# Patient Record
Sex: MALE | Employment: FULL TIME | URBAN - METROPOLITAN AREA
[De-identification: names, ages, dates, MRNs, and addresses within clinical notes are randomized per-mention and may not be internally consistent; named-entity substitution may affect disease eponyms.]

---

## 2018-05-24 ENCOUNTER — APPOINTMENT (OUTPATIENT)
Dept: VASCULAR SURGERY | Facility: CLINIC | Age: 33
End: 2018-05-24
Payer: COMMERCIAL

## 2018-05-24 PROCEDURE — 99207 ZZC VEINSOLUTIONS FREE SCREENING: CPT | Performed by: SURGERY

## 2018-06-01 ENCOUNTER — APPOINTMENT (OUTPATIENT)
Dept: VASCULAR SURGERY | Facility: CLINIC | Age: 33
End: 2018-06-01
Payer: COMMERCIAL

## 2018-06-01 ENCOUNTER — OFFICE VISIT (OUTPATIENT)
Dept: VASCULAR SURGERY | Facility: CLINIC | Age: 33
End: 2018-06-01
Payer: COMMERCIAL

## 2018-06-01 DIAGNOSIS — Z53.9 ERRONEOUS ENCOUNTER--DISREGARD: Primary | ICD-10-CM

## 2018-06-01 PROCEDURE — 99203 OFFICE O/P NEW LOW 30 MIN: CPT | Performed by: SURGERY

## 2018-06-01 PROCEDURE — 93971 EXTREMITY STUDY: CPT | Performed by: SURGERY

## 2018-06-01 NOTE — MR AVS SNAPSHOT
"              After Visit Summary   2018    Mikel Hoyos    MRN: [id number]           Patient Information     Date Of Birth          1985        Visit Information        Provider Department      2018 10:30 AM Lino Sun MD [facility]      Today's Diagnoses     ERRONEOUS ENCOUNTER--DISREGARD    -  1       Follow-ups after your visit        Who to contact     If you have questions or need follow up information about today's clinic visit or your schedule please contact [facility] directly at [phone].  Normal or non-critical lab and imaging results will be communicated to you by Hawthornehart, letter or phone within 4 business days after the clinic has received the results. If you do not hear from us within 7 days, please contact the clinic through Hawthornehart or phone. If you have a critical or abnormal lab result, we will notify you by phone as soon as possible.  Submit refill requests through MENA360 or call your pharmacy and they will forward the refill request to us. Please allow 3 business days for your refill to be completed.          Additional Information About Your Visit        MyChart Information     [employer] lets you send messages to your doctor, view your test results, renew your prescriptions, schedule appointments and more. To sign up, go to [email or web address] . Click on \"Log in\" on the left side of the screen, which will take you to the Welcome page. Then click on \"Sign up Now\" on the right side of the page.     You will be asked to enter the access code listed below, as well as some personal information. Please follow the directions to create your username and password.     Your access code is: [email or web address]  Expires: 2018  9:20 AM     Your access code will  in 90 days. If you need help or a new code, please call your [facility] clinic or [phone].        Care EveryWhere ID     This " is your Care EveryWhere ID. This could be used by other organizations to access your [place] medical records  [id number]         Blood Pressure from Last 3 Encounters:   No data found for BP    Weight from Last 3 Encounters:   No data found for Wt              Today, you had the following     No orders found for display       Primary Care Provider Fax #    Physician No Ref-Primary [phone]       No address on file        Equal Access to Services     [facility]: Hadii aad ku hadasho Soomaali, waaxda luqadaha, qaybta kaalmada adeegyada, waxay jelenain hayjohnn adeoctavio zain lalove . So [facility] [phone].    ATENCIÓN: Si habla español, tiene a francois disposición servicios gratuitos de asistencia lingüística. Llame al [phone].    We comply with applicable federal civil rights laws and [place] laws. We do not discriminate on the basis of race, color, national origin, age, disability, sex, sexual orientation, or gender identity.            Thank you!     Thank you for choosing [facility]  for your care. Our goal is always to provide you with excellent care. Hearing back from our patients is one way we can continue to improve our services. Please take a few minutes to complete the written survey that you may receive in the mail after your visit with us. Thank you!             Your Updated Medication List - Protect others around you: Learn how to safely use, store and throw away your medicines at www.disposemymeds.org.      Notice  As of 6/1/2018 11:59 PM    You have not been prescribed any medications.

## 2018-06-01 NOTE — PROGRESS NOTES
[facility]    CC:  Right lower leg varicose veins and mild edema with fatigue.    HPI: This is a 32-year-old male I am seeing back today in [facility] after completion of right lower extremity venous incompetency study.  His known history of right lower extremity varicose veins in the posterior medial calf that have been present for numerous years with mild swelling and significant aching and fatigue after being on his feet for prolonged periods of time.  Patient has no history of DVT, no history of clotting disorder, no previous history of treatment or surgery, no history of trauma, and some family history of varicose veins.  Says that he gets significant aching in the right calf especially after working out or prolonged standing.  He says he also gets significant tenderness and pain in the right posterior calf varicose veins with certain lifting activities.  He has been measured and fitted for compression hose as he is not previously used compression therapy for treatment.  He denies any history of phlebitis at this time and no bleeding episodes.  Is definitely interested in considering treatment in the future if conservative therapy is unsuccessful.      ROS:  Negative 10 point ROS.    No past medical history on file.     Past surgical history:    Social History     Social History     Marital status: Single     Spouse name: N/A     Number of children: N/A     Years of education: N/A     Occupational History     Not on file.     Social History Main Topics     Smoking status: Not on file     Smokeless tobacco: Not on file     Alcohol use Not on file     Drug use: Not on file     Sexual activity: Not on file     Other Topics Concern     Not on file     Social History Narrative     No family history on file.    Physical Examination:  General: NAD, alert and oriented x3.  Well developed healthy male.   HEENT: PERRLA, mucous membranes moist.  Lungs: Non-labored breathing  Skin:   Right lower posteromedial calf with 4-6 mm varicose veins in a nest that look like branches off the right GSV.  No skin changes, no phlebitis or dermatitis.  Right calf is slightly larger then the left from swelling.   Neurologic:  Normal motor, gait and sensory examination.  Psych: Normal mood, affect and judgement.    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  Gives rise to 2 large varicose veins branches that are located in the posteromedial calf.  No deep vein incompetence and no DVT.     Assessment: 31 yo male with varicose veins, aching pain and fatigue and mild edema in the right leg from an incompetent GSV.     Plan:   I've discussed that he should continue to wear his compression hose.  I will see Mikel back at three months for follow up and discussion about his progress with treatment.  He has been measured and fitted for knee high compression 20-30 mmHg.  If his symptoms persist or worsen I think he would benefit from ablation of the GSV in the right lower leg.      Lino Sun MD  Vascular Surgery

## 2024-02-01 ENCOUNTER — APPOINTMENT (OUTPATIENT)
Dept: URBAN - METROPOLITAN AREA CLINIC 254 | Age: 39
Setting detail: DERMATOLOGY
End: 2024-02-02

## 2024-02-01 DIAGNOSIS — L71.8 OTHER ROSACEA: ICD-10-CM

## 2024-02-01 PROCEDURE — OTHER MIPS QUALITY: OTHER

## 2024-02-01 PROCEDURE — 99214 OFFICE O/P EST MOD 30 MIN: CPT

## 2024-02-01 PROCEDURE — OTHER PRESCRIPTION: OTHER

## 2024-02-01 PROCEDURE — OTHER COUNSELING: OTHER

## 2024-02-01 PROCEDURE — OTHER PRESCRIPTION MEDICATION MANAGEMENT: OTHER

## 2024-02-01 RX ORDER — AZELAIC ACID 0.15 G/G
GEL TOPICAL QD
Qty: 50 | Refills: 4 | Status: ERX | COMMUNITY
Start: 2024-02-01

## 2024-02-01 RX ORDER — DOXYCYCLINE HYCLATE 100 MG/1
CAPSULE, GELATIN COATED ORAL BID
Qty: 60 | Refills: 1 | Status: ERX | COMMUNITY
Start: 2024-02-01

## 2024-02-01 ASSESSMENT — LOCATION DETAILED DESCRIPTION DERM
LOCATION DETAILED: LEFT CENTRAL MALAR CHEEK
LOCATION DETAILED: RIGHT CENTRAL MALAR CHEEK

## 2024-02-01 ASSESSMENT — LOCATION SIMPLE DESCRIPTION DERM
LOCATION SIMPLE: RIGHT CHEEK
LOCATION SIMPLE: LEFT CHEEK

## 2024-02-01 ASSESSMENT — LOCATION ZONE DERM: LOCATION ZONE: FACE

## 2024-02-01 NOTE — HPI: RASH
Is This A New Presentation, Or A Follow-Up?: Rash
Additional History: Pt states the rash started when he was shaving his face it comes and goes but doesn’t fully go away. Pt recently starts la roche sun screen but hasn’t helped the rash or made it worse.

## 2024-02-01 NOTE — PROCEDURE: COUNSELING
Detail Level: Zone
Patient Specific Counseling (Will Not Stick From Patient To Patient): ** recommended otc azelaic acid if Rx isn’t covered.